# Patient Record
Sex: FEMALE | NOT HISPANIC OR LATINO | Employment: OTHER
[De-identification: names, ages, dates, MRNs, and addresses within clinical notes are randomized per-mention and may not be internally consistent; named-entity substitution may affect disease eponyms.]

---

## 2021-01-01 ENCOUNTER — OFFICE VISIT (OUTPATIENT)
Age: 81
End: 2021-01-01

## 2021-11-24 ENCOUNTER — OFFICE VISIT (OUTPATIENT)
Dept: URBAN - METROPOLITAN AREA CLINIC 9 | Facility: CLINIC | Age: 81
End: 2021-11-24

## 2022-07-30 ENCOUNTER — TELEPHONE ENCOUNTER (OUTPATIENT)
Age: 82
End: 2022-07-30

## 2022-07-31 ENCOUNTER — TELEPHONE ENCOUNTER (OUTPATIENT)
Age: 82
End: 2022-07-31

## 2023-01-26 ENCOUNTER — OFFICE VISIT (OUTPATIENT)
Dept: URBAN - METROPOLITAN AREA CLINIC 9 | Facility: CLINIC | Age: 83
End: 2023-01-26
Payer: MEDICARE

## 2023-01-26 ENCOUNTER — LAB OUTSIDE AN ENCOUNTER (OUTPATIENT)
Dept: URBAN - METROPOLITAN AREA CLINIC 9 | Facility: CLINIC | Age: 83
End: 2023-01-26

## 2023-01-26 VITALS
WEIGHT: 170 LBS | HEIGHT: 61 IN | SYSTOLIC BLOOD PRESSURE: 119 MMHG | BODY MASS INDEX: 32.1 KG/M2 | DIASTOLIC BLOOD PRESSURE: 70 MMHG

## 2023-01-26 DIAGNOSIS — R11.0 NAUSEA: ICD-10-CM

## 2023-01-26 DIAGNOSIS — K59.09 CHRONIC CONSTIPATION: ICD-10-CM

## 2023-01-26 DIAGNOSIS — R10.13 EPIGASTRIC PAIN: ICD-10-CM

## 2023-01-26 DIAGNOSIS — R19.8 CHANGE IN BOWEL MOVEMENT: ICD-10-CM

## 2023-01-26 DIAGNOSIS — R11.15 PERSISTENT VOMITING: ICD-10-CM

## 2023-01-26 PROBLEM — 79922009: Status: ACTIVE | Noted: 2023-01-26

## 2023-01-26 PROBLEM — 196746003: Status: ACTIVE | Noted: 2023-01-26

## 2023-01-26 PROBLEM — 88111009: Status: ACTIVE | Noted: 2023-01-26

## 2023-01-26 PROBLEM — 236069009: Status: ACTIVE | Noted: 2023-01-26

## 2023-01-26 PROCEDURE — 99215 OFFICE O/P EST HI 40 MIN: CPT | Performed by: STUDENT IN AN ORGANIZED HEALTH CARE EDUCATION/TRAINING PROGRAM

## 2023-01-26 RX ORDER — TRAMADOL HYDROCHLORIDE 50 MG/1
1 TABLET AS NEEDED TABLET, FILM COATED ORAL ONCE A DAY
Status: ACTIVE | COMMUNITY

## 2023-01-26 RX ORDER — OMEPRAZOLE 40 MG/1
1 CAPSULE 30 MINUTES BEFORE MORNING MEAL CAPSULE, DELAYED RELEASE ORAL ONCE A DAY
Status: ACTIVE | COMMUNITY

## 2023-01-26 RX ORDER — VENLAFAXINE HYDROCHLORIDE 37.5 MG/1
1 TABLET WITH FOOD TABLET ORAL ONCE A DAY
Status: ACTIVE | COMMUNITY

## 2023-01-26 RX ORDER — TRAZODONE HYDROCHLORIDE 150 MG/1
1 TABLET AT BEDTIME TABLET ORAL ONCE A DAY
Status: ACTIVE | COMMUNITY

## 2023-01-26 RX ORDER — LEVOTHYROXINE SODIUM 25 UG/1
1 TABLET IN THE MORNING ON AN EMPTY STOMACH TABLET ORAL ONCE A DAY
Status: ACTIVE | COMMUNITY

## 2023-01-26 RX ORDER — GABAPENTIN 300 MG/1
1 CAPSULE CAPSULE ORAL ONCE A DAY
Status: ACTIVE | COMMUNITY

## 2023-01-26 NOTE — HPI-TODAY'S VISIT:
82 YO Female presents for chronic vomtiing.  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  Patient had an extensive workup at [facility] with PET scan done without evidence of lympoma.  Now patient presents for persistent vomiting for 1 month duration.  Has been dark colored at times, to both liquid and solids consistency.  Patient has been takign PPI and Zofran without alleviation.  POssibel etiologies consist of gastroparesis, gastric ulceration and PUD,   All previous bloodwork PET scans, previous endoscopy reviewed with patient.  Time spent > 45 minutes.  ALARM SYMPTOMS --No odynophagia --No hematemesis --YES melena / hematochezia --No unintentional weight loss --No iron deficiency anemia  PERTINENT MEDICAL HISTORY Aspirin 81mg PO daily:   --Not Taking Other Blood Thinners:   --Not Taking Diabetes Medication:   --No History  Cardiac Disease:   --No History  Pulmonary Disease --No History  Abdominal Surgery & Hernia:  No History   PERTINENT GI FAMILY HISTORY Colon polyps:  no family history Colon cancer:  no family history   GASTROINTESTINAL PROCEDURE HISTORY Colonoscopy:	 --2019  Findings:  --only verbal report from patient available --normal without polyps  EGD:   --2021

## 2023-02-20 ENCOUNTER — CLAIMS CREATED FROM THE CLAIM WINDOW (OUTPATIENT)
Dept: URBAN - METROPOLITAN AREA CLINIC 4 | Facility: CLINIC | Age: 83
End: 2023-02-20
Payer: MEDICARE

## 2023-02-20 ENCOUNTER — OFFICE VISIT (OUTPATIENT)
Dept: URBAN - METROPOLITAN AREA SURGERY CENTER 9 | Facility: SURGERY CENTER | Age: 83
End: 2023-02-20
Payer: MEDICARE

## 2023-02-20 DIAGNOSIS — Z85.72 HISTORY OF NON-HODGKIN'S LYMPHOMA: ICD-10-CM

## 2023-02-20 DIAGNOSIS — K29.70 CHRONIC ACITVE GASTRITIS (H.PYLORI NEGATIVE): ICD-10-CM

## 2023-02-20 DIAGNOSIS — K25.9 ANTRAL ULCER: ICD-10-CM

## 2023-02-20 DIAGNOSIS — K31.89 OTHER DISEASES OF STOMACH AND DUODENUM: ICD-10-CM

## 2023-02-20 PROBLEM — 428046009: Status: ACTIVE | Noted: 2023-02-20

## 2023-02-20 PROCEDURE — 88341 IMHCHEM/IMCYTCHM EA ADD ANTB: CPT | Performed by: PATHOLOGY

## 2023-02-20 PROCEDURE — 88342 IMHCHEM/IMCYTCHM 1ST ANTB: CPT | Performed by: PATHOLOGY

## 2023-02-20 PROCEDURE — 43239 EGD BIOPSY SINGLE/MULTIPLE: CPT | Performed by: STUDENT IN AN ORGANIZED HEALTH CARE EDUCATION/TRAINING PROGRAM

## 2023-02-20 PROCEDURE — 43239 EGD BIOPSY SINGLE/MULTIPLE: CPT | Performed by: CLINIC/CENTER

## 2023-02-20 PROCEDURE — 88305 TISSUE EXAM BY PATHOLOGIST: CPT | Performed by: PATHOLOGY

## 2023-02-20 RX ORDER — LEVOTHYROXINE SODIUM 25 UG/1
1 TABLET IN THE MORNING ON AN EMPTY STOMACH TABLET ORAL ONCE A DAY
Status: ACTIVE | COMMUNITY

## 2023-02-20 RX ORDER — OMEPRAZOLE 40 MG/1
1 CAPSULE 30 MINUTES BEFORE MORNING MEAL CAPSULE, DELAYED RELEASE ORAL ONCE A DAY
Status: ACTIVE | COMMUNITY

## 2023-02-20 RX ORDER — TRAZODONE HYDROCHLORIDE 150 MG/1
1 TABLET AT BEDTIME TABLET ORAL ONCE A DAY
Status: ACTIVE | COMMUNITY

## 2023-02-20 RX ORDER — VENLAFAXINE HYDROCHLORIDE 37.5 MG/1
1 TABLET WITH FOOD TABLET ORAL ONCE A DAY
Status: ACTIVE | COMMUNITY

## 2023-02-20 RX ORDER — TRAMADOL HYDROCHLORIDE 50 MG/1
1 TABLET AS NEEDED TABLET, FILM COATED ORAL ONCE A DAY
Status: ACTIVE | COMMUNITY

## 2023-02-20 RX ORDER — GABAPENTIN 300 MG/1
1 CAPSULE CAPSULE ORAL ONCE A DAY
Status: ACTIVE | COMMUNITY

## 2023-04-17 ENCOUNTER — WEB ENCOUNTER (OUTPATIENT)
Dept: URBAN - METROPOLITAN AREA CLINIC 9 | Facility: CLINIC | Age: 83
End: 2023-04-17

## 2023-04-17 ENCOUNTER — OFFICE VISIT (OUTPATIENT)
Dept: URBAN - METROPOLITAN AREA CLINIC 9 | Facility: CLINIC | Age: 83
End: 2023-04-17
Payer: MEDICARE

## 2023-04-17 ENCOUNTER — DASHBOARD ENCOUNTERS (OUTPATIENT)
Age: 83
End: 2023-04-17

## 2023-04-17 VITALS
BODY MASS INDEX: 31.15 KG/M2 | SYSTOLIC BLOOD PRESSURE: 110 MMHG | HEIGHT: 61 IN | DIASTOLIC BLOOD PRESSURE: 72 MMHG | WEIGHT: 165 LBS

## 2023-04-17 DIAGNOSIS — R10.13 EPIGASTRIC PAIN: ICD-10-CM

## 2023-04-17 DIAGNOSIS — Z85.72 HISTORY OF NON-HODGKIN'S LYMPHOMA: ICD-10-CM

## 2023-04-17 DIAGNOSIS — K59.09 CHRONIC CONSTIPATION: ICD-10-CM

## 2023-04-17 DIAGNOSIS — R53.82 CHRONIC FATIGUE: ICD-10-CM

## 2023-04-17 DIAGNOSIS — K25.7 CHRONIC GASTRIC ULCER WITHOUT HEMORRHAGE AND WITHOUT PERFORATION: ICD-10-CM

## 2023-04-17 PROBLEM — 76796008: Status: ACTIVE | Noted: 2023-04-17

## 2023-04-17 PROBLEM — 84229001: Status: ACTIVE | Noted: 2023-04-17

## 2023-04-17 PROCEDURE — 99214 OFFICE O/P EST MOD 30 MIN: CPT | Performed by: STUDENT IN AN ORGANIZED HEALTH CARE EDUCATION/TRAINING PROGRAM

## 2023-04-17 RX ORDER — TRAMADOL HYDROCHLORIDE 50 MG/1
1 TABLET AS NEEDED TABLET, FILM COATED ORAL ONCE A DAY
Status: ACTIVE | COMMUNITY

## 2023-04-17 RX ORDER — VENLAFAXINE HYDROCHLORIDE 37.5 MG/1
1 TABLET WITH FOOD TABLET ORAL ONCE A DAY
Status: ACTIVE | COMMUNITY

## 2023-04-17 RX ORDER — TRAZODONE HYDROCHLORIDE 150 MG/1
1 TABLET AT BEDTIME TABLET ORAL ONCE A DAY
Status: ACTIVE | COMMUNITY

## 2023-04-17 RX ORDER — GABAPENTIN 300 MG/1
1 CAPSULE CAPSULE ORAL ONCE A DAY
Status: ACTIVE | COMMUNITY

## 2023-04-17 RX ORDER — LEVOTHYROXINE SODIUM 25 UG/1
1 TABLET IN THE MORNING ON AN EMPTY STOMACH TABLET ORAL ONCE A DAY
Status: ACTIVE | COMMUNITY

## 2023-04-17 RX ORDER — OMEPRAZOLE 40 MG/1
1 CAPSULE 30 MINUTES BEFORE MORNING MEAL CAPSULE, DELAYED RELEASE ORAL ONCE A DAY
Status: ACTIVE | COMMUNITY

## 2023-04-17 NOTE — HPI-TODAY'S VISIT:
84 YO Female presents for chronic vomtiing.  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  Patient had an extensive workup at [facility] with PET scan done without evidence of lympoma.  Now patient presents for persistent vomiting for 1 month duration.  Has been dark colored at times, to both liquid and solids consistency.  Patient has been takign PPI and Zofran without alleviation.  POssibel etiologies consist of gastroparesis, gastric ulceration and PUD,   All previous bloodwork PET scans, previous endoscopy reviewed with patient.  Time spent > 45 minutes.   4/17/23: presents for follow up after EGD with no evidence of lymphoma in any biopsies.  Proceudre reports sent to doctor as patient requested.  If symptoms of chronic constipation and patient is taking tramadol for pain.  Advised patient to have daily miralax or metamucil for bowel regimen. Samples given to patient.  RTC as needed.  PERTINENT MEDICAL HISTORY Aspirin 81mg PO daily:   --Not Taking Other Blood Thinners:   --Not Taking Diabetes Medication:   --No History  Cardiac Disease:   --No History  Pulmonary Disease --No History  Abdominal Surgery & Hernia:  No History   PERTINENT GI FAMILY HISTORY Colon polyps:  no family history Colon cancer:  no family history   GASTROINTESTINAL PROCEDURE HISTORY Colonoscopy:	 --2019  Findings:  --only verbal report from patient available --normal without polyps  EGD:   --2021

## 2025-04-01 ENCOUNTER — OFFICE VISIT (OUTPATIENT)
Dept: URBAN - METROPOLITAN AREA CLINIC 9 | Facility: CLINIC | Age: 85
End: 2025-04-01
Payer: MEDICARE

## 2025-04-01 DIAGNOSIS — D64.9 ABSOLUTE ANEMIA: ICD-10-CM

## 2025-04-01 DIAGNOSIS — R11.15 PERSISTENT VOMITING: ICD-10-CM

## 2025-04-01 DIAGNOSIS — K29.70 CHRONIC ACITVE GASTRITIS (H.PYLORI NEGATIVE): ICD-10-CM

## 2025-04-01 DIAGNOSIS — K59.09 CHRONIC CONSTIPATION: ICD-10-CM

## 2025-04-01 PROBLEM — 271737000: Status: ACTIVE | Noted: 2025-04-01

## 2025-04-01 PROBLEM — 4556007: Status: ACTIVE | Noted: 2025-04-01

## 2025-04-01 PROCEDURE — 99214 OFFICE O/P EST MOD 30 MIN: CPT | Performed by: PHYSICIAN ASSISTANT

## 2025-04-01 RX ORDER — GABAPENTIN 300 MG/1
1 CAPSULE CAPSULE ORAL ONCE A DAY
Status: ACTIVE | COMMUNITY

## 2025-04-01 RX ORDER — LEVOTHYROXINE SODIUM 25 UG/1
1 TABLET IN THE MORNING ON AN EMPTY STOMACH TABLET ORAL ONCE A DAY
Status: ACTIVE | COMMUNITY

## 2025-04-01 RX ORDER — VENLAFAXINE HYDROCHLORIDE 37.5 MG/1
1 TABLET WITH FOOD TABLET ORAL ONCE A DAY
Status: ACTIVE | COMMUNITY

## 2025-04-01 RX ORDER — TRAMADOL HYDROCHLORIDE 50 MG/1
1 TABLET AS NEEDED TABLET, FILM COATED ORAL ONCE A DAY
Status: ACTIVE | COMMUNITY

## 2025-04-01 RX ORDER — OMEPRAZOLE 40 MG/1
1 CAPSULE 30 MINUTES BEFORE MORNING MEAL CAPSULE, DELAYED RELEASE ORAL ONCE A DAY
Status: ACTIVE | COMMUNITY

## 2025-04-01 RX ORDER — TRAZODONE HYDROCHLORIDE 150 MG/1
1 TABLET AT BEDTIME TABLET ORAL ONCE A DAY
Status: ACTIVE | COMMUNITY

## 2025-04-01 RX ORDER — VIBEGRON 75 MG/1
TABLET, FILM COATED ORAL
Qty: 30 TABLET | Status: ACTIVE | COMMUNITY

## 2025-04-01 NOTE — PHYSICAL EXAM CONSTITUTIONAL:
well developed, well nourished , in no acute distress , ambulating without difficulty , normal communication ability, she is alert, awake.

## 2025-04-01 NOTE — HPI-TODAY'S VISIT:
Female patient, formally seen by Dr. Mariano most recently 4/2023, with history of  EMZL. At that time patient was having significant vomiting and dark stools.  PERTINENT GI FAMILY HISTORY Colon polyps:  no family history  GASTROINTESTINAL PROCEDURE HISTORY Colonoscopy: --2019. --only verbal report from patient available, no polyps. Dr. Chen.  EGD -  2/2023 gastritis/gastric ulcers, path with no significant abnormality of duodenum, foveolar hyperplasia of stomach, no lymphoma, no HP.   Lab 1/2025 LFTs-NL, TSH elevated, H GB/HCT 9.0/29.3, indices low, platelets normal.  Patient denies use of blood thinners, hx of CV disease, pacemaker or ICD or other implanted wired device, seizure disorders, SOILA, use of CPAP, severe pulmonary disease, prior issues with anesthesia.   IH 4/17/23: presents for follow up after EGD with no evidence of lymphoma in any biopsies. Proceudre reports sent to doctor as patient requested. If symptoms of chronic constipation and patient is taking tramadol for pain. Advised patient to have daily miralax or metamucil for bowel regimen. Samples given to patient. RTC as needed.  IH 4/1/25 PT patient referred by hematologist for further evaluation of anemia. Hg 9.0/Hct 29.3.  No overt signs of bleeding. She denies more than usual fatigue, lightheadedness/dizziness, shortness of breath, tachycardia. Last colonoscopy 2019, last EGD 2023-see reports above. Not taking oral or IV iron.   Also, began to throw up three weeks ago - occurs after drinking coffee in am and can occur multiple times a day..  This has been an issue for her for years.   She has chronic constipation - uses Miralax and controls well.  She has had no recent CTs.  Recent labs with nl LFTS.   Will arrange for EGD (first (d/t age) and repeat CBC CBC, obtain studies iron studies.  She was originally referred back in Jan to [facility] but refuses to ever go back there. Just now got around to getting to us. If EGD unrevealing for cause of anemia, then colonoscopy, capsule if that is negative. Pt expresses understanding of risk of all procedues d/t age.

## 2025-04-15 ENCOUNTER — TELEPHONE ENCOUNTER (OUTPATIENT)
Dept: URBAN - METROPOLITAN AREA CLINIC 9 | Facility: CLINIC | Age: 85
End: 2025-04-15

## 2025-04-15 ENCOUNTER — CLAIMS CREATED FROM THE CLAIM WINDOW (OUTPATIENT)
Dept: URBAN - METROPOLITAN AREA CLINIC 4 | Facility: CLINIC | Age: 85
End: 2025-04-15
Payer: MEDICARE

## 2025-04-15 ENCOUNTER — CLAIMS CREATED FROM THE CLAIM WINDOW (OUTPATIENT)
Dept: URBAN - METROPOLITAN AREA SURGERY CENTER 9 | Facility: SURGERY CENTER | Age: 85
End: 2025-04-15
Payer: MEDICARE

## 2025-04-15 ENCOUNTER — TELEPHONE ENCOUNTER (OUTPATIENT)
Dept: URBAN - METROPOLITAN AREA SURGERY CENTER 9 | Facility: SURGERY CENTER | Age: 85
End: 2025-04-15

## 2025-04-15 DIAGNOSIS — K44.9 HIATAL HERNIA: ICD-10-CM

## 2025-04-15 DIAGNOSIS — K29.70 GASTRITIS: ICD-10-CM

## 2025-04-15 DIAGNOSIS — K44.9 DIAPHRAGMATIC HERNIA WITHOUT OBSTRUCTION OR GANGRENE: ICD-10-CM

## 2025-04-15 DIAGNOSIS — K31.89 OTHER DISEASES OF STOMACH AND DUODENUM: ICD-10-CM

## 2025-04-15 DIAGNOSIS — K29.70 GASTRITIS WITHOUT BLEEDING, UNSPECIFIED CHRONICITY, UNSPECIFIED GASTRITIS TYPE: ICD-10-CM

## 2025-04-15 DIAGNOSIS — K31.7 POLYP OF STOMACH AND DUODENUM: ICD-10-CM

## 2025-04-15 PROBLEM — 397825006: Status: ACTIVE | Noted: 2025-04-15

## 2025-04-15 PROCEDURE — 88305 TISSUE EXAM BY PATHOLOGIST: CPT | Performed by: PATHOLOGY

## 2025-04-15 PROCEDURE — 43239 EGD BIOPSY SINGLE/MULTIPLE: CPT | Performed by: CLINIC/CENTER

## 2025-04-15 PROCEDURE — 88312 SPECIAL STAINS GROUP 1: CPT | Performed by: PATHOLOGY

## 2025-04-15 PROCEDURE — 43239 EGD BIOPSY SINGLE/MULTIPLE: CPT | Performed by: INTERNAL MEDICINE

## 2025-04-15 PROCEDURE — 00731 ANES UPR GI NDSC PX NOS: CPT | Performed by: NURSE ANESTHETIST, CERTIFIED REGISTERED

## 2025-04-15 RX ORDER — GABAPENTIN 300 MG/1
1 CAPSULE CAPSULE ORAL ONCE A DAY
Status: ACTIVE | COMMUNITY

## 2025-04-15 RX ORDER — LEVOTHYROXINE SODIUM 25 UG/1
1 TABLET IN THE MORNING ON AN EMPTY STOMACH TABLET ORAL ONCE A DAY
Status: ACTIVE | COMMUNITY

## 2025-04-15 RX ORDER — OMEPRAZOLE 40 MG/1
1 CAPSULE 30 MINUTES BEFORE MORNING MEAL CAPSULE, DELAYED RELEASE ORAL ONCE A DAY
Status: ACTIVE | COMMUNITY

## 2025-04-15 RX ORDER — VIBEGRON 75 MG/1
TABLET, FILM COATED ORAL
Qty: 30 TABLET | Status: ACTIVE | COMMUNITY

## 2025-04-15 RX ORDER — TRAMADOL HYDROCHLORIDE 50 MG/1
1 TABLET AS NEEDED TABLET, FILM COATED ORAL ONCE A DAY
Status: ACTIVE | COMMUNITY

## 2025-04-15 RX ORDER — OMEPRAZOLE 40 MG/1
1 CAPSULE 30 MINUTES BEFORE MORNING MEAL CAPSULE, DELAYED RELEASE ORAL TWICE A DAY
Qty: 90 | Refills: 1

## 2025-04-15 RX ORDER — SUCRALFATE 1 G/1
1 TABLET ON AN EMPTY STOMACH TABLET ORAL
Qty: 120 TABLET | Refills: 3 | OUTPATIENT
Start: 2025-04-15

## 2025-04-15 RX ORDER — TRAZODONE HYDROCHLORIDE 150 MG/1
1 TABLET AT BEDTIME TABLET ORAL ONCE A DAY
Status: ACTIVE | COMMUNITY

## 2025-04-15 RX ORDER — VENLAFAXINE HYDROCHLORIDE 37.5 MG/1
1 TABLET WITH FOOD TABLET ORAL ONCE A DAY
Status: ACTIVE | COMMUNITY

## 2025-04-23 ENCOUNTER — OFFICE VISIT (OUTPATIENT)
Dept: URBAN - METROPOLITAN AREA CLINIC 9 | Facility: CLINIC | Age: 85
End: 2025-04-23

## 2025-04-30 ENCOUNTER — TELEPHONE ENCOUNTER (OUTPATIENT)
Dept: URBAN - METROPOLITAN AREA CLINIC 9 | Facility: CLINIC | Age: 85
End: 2025-04-30

## 2025-05-20 ENCOUNTER — OFFICE VISIT (OUTPATIENT)
Dept: URBAN - METROPOLITAN AREA CLINIC 9 | Facility: CLINIC | Age: 85
End: 2025-05-20
Payer: MEDICARE

## 2025-05-20 DIAGNOSIS — R11.15 PERSISTENT VOMITING: ICD-10-CM

## 2025-05-20 DIAGNOSIS — R10.13 EPIGASTRIC PAIN: ICD-10-CM

## 2025-05-20 DIAGNOSIS — K44.9 LARGE HIATAL HERNIA: ICD-10-CM

## 2025-05-20 DIAGNOSIS — R14.0 ABDOMINAL BLOATING: ICD-10-CM

## 2025-05-20 PROCEDURE — 99214 OFFICE O/P EST MOD 30 MIN: CPT | Performed by: PHYSICIAN ASSISTANT

## 2025-05-20 RX ORDER — VENLAFAXINE HYDROCHLORIDE 37.5 MG/1
1 TABLET WITH FOOD TABLET ORAL ONCE A DAY
Status: ACTIVE | COMMUNITY

## 2025-05-20 RX ORDER — GABAPENTIN 300 MG/1
1 CAPSULE CAPSULE ORAL ONCE A DAY
Status: ACTIVE | COMMUNITY

## 2025-05-20 RX ORDER — LEVOTHYROXINE SODIUM 25 UG/1
1 TABLET IN THE MORNING ON AN EMPTY STOMACH TABLET ORAL ONCE A DAY
Status: ACTIVE | COMMUNITY

## 2025-05-20 RX ORDER — VIBEGRON 75 MG/1
TABLET, FILM COATED ORAL
Qty: 30 TABLET | Status: ACTIVE | COMMUNITY

## 2025-05-20 RX ORDER — TRAZODONE HYDROCHLORIDE 150 MG/1
1 TABLET AT BEDTIME TABLET ORAL ONCE A DAY
Status: ACTIVE | COMMUNITY

## 2025-05-20 RX ORDER — SUCRALFATE 1 G/1
1 TABLET ON AN EMPTY STOMACH TABLET ORAL
Qty: 120 TABLET | Refills: 3 | Status: ACTIVE | COMMUNITY
Start: 2025-04-15

## 2025-05-20 RX ORDER — TRAMADOL HYDROCHLORIDE 50 MG/1
1 TABLET AS NEEDED TABLET, FILM COATED ORAL ONCE A DAY
Status: ACTIVE | COMMUNITY

## 2025-05-20 RX ORDER — OMEPRAZOLE 40 MG/1
1 CAPSULE 30 MINUTES BEFORE MORNING MEAL CAPSULE, DELAYED RELEASE ORAL TWICE A DAY
Qty: 90 | Refills: 1 | Status: ACTIVE | COMMUNITY

## 2025-05-20 NOTE — HPI-TODAY'S VISIT:
Female patient, formally seen by Dr. Mariano most recently 4/2023, with history of  EMZL. At that time patient was having significant vomiting and dark stools.  PERTINENT GI FAMILY HISTORY Colon polyps:  no family history  ENDOs: Colonoscopy: --2019. --only verbal report from patient available, no polyps. Dr. Chen.  EGD -  2/2023 gastritis/gastric ulcers, path with no significant abnormality of duodenum, foveolar hyperplasia of stomach, no lymphoma, no HP.  EGD 2025 - Large hiatal hernia with significant inflammation. Path with gastropathy, no evidence of celiac.  Lab 1/2025 LFTs-NL, TSH elevated, H GB/HCT 9.0/29.3, indices low, platelets normal.  Patient denies use of blood thinners, hx of CV disease, pacemaker or ICD or other implanted wired device, seizure disorders, SOILA, use of CPAP, severe pulmonary disease, prior issues with anesthesia.   IH 4/17/23: presents for follow up after EGD with no evidence of lymphoma in any biopsies. Proceudre reports sent to doctor as patient requested. If symptoms of chronic constipation and patient is taking tramadol for pain. Advised patient to have daily miralax or metamucil for bowel regimen. Samples given to patient. RTC as needed.  IH 4/1/25 PT patient referred by hematologist for further evaluation of anemia. Hg 9.0/Hct 29.3.  No overt signs of bleeding. She denies more than usual fatigue, lightheadedness/dizziness, shortness of breath, tachycardia. Last colonoscopy 2019, last EGD 2023-see reports above. Not taking oral or IV iron.   Also, began to throw up three weeks ago - occurs after drinking coffee in am and can occur multiple times a day..  This has been an issue for her for years.   She has chronic constipation - uses Miralax and controls well.  She has had no recent CTs.  Recent labs with nl LFTS.   Will arrange for EGD (first (d/t age) and repeat CBC CBC, obtain studies iron studies.  She was originally referred back in Jan to [facility] but refuses to ever go back there. Just now got around to getting to us. If EGD unrevealing for cause of anemia, then colonoscopy, capsule if that is negative. Pt expresses understanding of risk of all procedues d/t age.  Interim visit 5/20/2025 EGD was scheduled at last visit. Found to have a large hiatal hernia with significant inflammation, likely cause of anemia. Bx with gastropathy, no evidence of celiac.  Placed on Carafate QID (but really only getting three in) and Prilosec BID with repeat EGD 6/16/2025. Nausea and epigastric discomfort are also resolved.  She is not feeling lightheaded any longer. Said that her BP was running low. She is drinking gatorade now.  Still feeling quite weak.  Goes once a week to [facility] for therapy.  She is taking iron qod.  Will check a CBC. SHe is also c/o of feeling bloated, clothes fitting tight around her abdomen. No abd pain, constipation well controlled.  WIll obtain CT s/p in light of abdominal syptoms. .   RTC post EGD in June.

## 2025-05-20 NOTE — PHYSICAL EXAM CHEST:
Bed: 01  Expected date:   Expected time:   Means of arrival:   Comments:  Radha when clean     chest wall non-tender, breathing is unlabored without accessory muscle use, normal breath sounds

## 2025-06-16 ENCOUNTER — CLAIMS CREATED FROM THE CLAIM WINDOW (OUTPATIENT)
Dept: URBAN - METROPOLITAN AREA CLINIC 4 | Facility: CLINIC | Age: 85
End: 2025-06-16
Payer: MEDICARE

## 2025-06-16 ENCOUNTER — CLAIMS CREATED FROM THE CLAIM WINDOW (OUTPATIENT)
Dept: URBAN - METROPOLITAN AREA SURGERY CENTER 9 | Facility: SURGERY CENTER | Age: 85
End: 2025-06-16
Payer: MEDICARE

## 2025-06-16 DIAGNOSIS — K29.00 ACUTE GASTRITIS WITHOUT BLEEDING: ICD-10-CM

## 2025-06-16 DIAGNOSIS — K31.89 OTHER DISEASES OF STOMACH AND DUODENUM: ICD-10-CM

## 2025-06-16 DIAGNOSIS — K44.9 DIAPHRAGMATIC HERNIA WITHOUT OBSTRUCTION OR GANGRENE: ICD-10-CM

## 2025-06-16 DIAGNOSIS — K29.70 GASTRITIS: ICD-10-CM

## 2025-06-16 DIAGNOSIS — K29.70 GASTRITIS WITHOUT BLEEDING, UNSPECIFIED CHRONICITY, UNSPECIFIED GASTRITIS TYPE: ICD-10-CM

## 2025-06-16 PROCEDURE — 43239 EGD BIOPSY SINGLE/MULTIPLE: CPT | Performed by: CLINIC/CENTER

## 2025-06-16 PROCEDURE — 88312 SPECIAL STAINS GROUP 1: CPT | Performed by: PATHOLOGY

## 2025-06-16 PROCEDURE — 00731 ANES UPR GI NDSC PX NOS: CPT | Performed by: NURSE ANESTHETIST, CERTIFIED REGISTERED

## 2025-06-16 PROCEDURE — 88305 TISSUE EXAM BY PATHOLOGIST: CPT | Performed by: PATHOLOGY

## 2025-06-16 PROCEDURE — 43239 EGD BIOPSY SINGLE/MULTIPLE: CPT | Performed by: INTERNAL MEDICINE

## 2025-06-16 RX ORDER — VIBEGRON 75 MG/1
TABLET, FILM COATED ORAL
Qty: 30 TABLET | Status: ACTIVE | COMMUNITY

## 2025-06-16 RX ORDER — SUCRALFATE 1 G/1
1 TABLET ON AN EMPTY STOMACH TABLET ORAL
Qty: 120 TABLET | Refills: 3 | Status: ACTIVE | COMMUNITY
Start: 2025-04-15

## 2025-06-16 RX ORDER — VENLAFAXINE HYDROCHLORIDE 37.5 MG/1
1 TABLET WITH FOOD TABLET ORAL ONCE A DAY
Status: ACTIVE | COMMUNITY

## 2025-06-16 RX ORDER — LEVOTHYROXINE SODIUM 25 UG/1
1 TABLET IN THE MORNING ON AN EMPTY STOMACH TABLET ORAL ONCE A DAY
Status: ACTIVE | COMMUNITY

## 2025-06-16 RX ORDER — TRAZODONE HYDROCHLORIDE 150 MG/1
1 TABLET AT BEDTIME TABLET ORAL ONCE A DAY
Status: ACTIVE | COMMUNITY

## 2025-06-16 RX ORDER — OMEPRAZOLE 40 MG/1
1 CAPSULE 30 MINUTES BEFORE MORNING MEAL CAPSULE, DELAYED RELEASE ORAL TWICE A DAY
Qty: 90 | Refills: 1 | Status: ACTIVE | COMMUNITY

## 2025-06-16 RX ORDER — GABAPENTIN 300 MG/1
1 CAPSULE CAPSULE ORAL ONCE A DAY
Status: ACTIVE | COMMUNITY

## 2025-06-16 RX ORDER — TRAMADOL HYDROCHLORIDE 50 MG/1
1 TABLET AS NEEDED TABLET, FILM COATED ORAL ONCE A DAY
Status: ACTIVE | COMMUNITY

## 2025-07-22 ENCOUNTER — OFFICE VISIT (OUTPATIENT)
Dept: URBAN - METROPOLITAN AREA CLINIC 9 | Facility: CLINIC | Age: 85
End: 2025-07-22

## 2025-07-22 NOTE — HPI-TODAY'S VISIT:
Female patient, formally seen by Dr. Mariano most recently 4/2023, with history of  EMZL. At that time patient was having significant vomiting and dark stools.  PERTINENT GI FAMILY HISTORY Colon polyps:  no family history  ENDOs: Colonoscopy: --2019. --only verbal report from patient available, no polyps. Dr. Chen.  EGD -  2/2023 gastritis/gastric ulcers, path with no significant abnormality of duodenum, foveolar hyperplasia of stomach, no lymphoma, no HP.  EGD 2025 - Large hiatal hernia with significant inflammation. Path with gastropathy, no evidence of celiac.   6/2025 EGD gastritis, large 5 cm HH, nodular mucosa in the cardia.  Path stomach unrevealing.  Path cardia stomach acute erosive/hemorrhagic gastritis, negative HP.  Repeat EGD 1 year advised.  Lab 1/2025 LFTs-NL, TSH elevated, H GB/HCT 9.0/29.3, indices low, platelets normal. 5/21/2025 H/H 11.2/36 (was 8.3/28 2 months ago). 6/2025 CT A/P with and without-moderate size sliding HH, otherwise no significant GI findings.  IH 4/17/23: presents for follow up after EGD with no evidence of lymphoma in any biopsies. Proceudre reports sent to doctor as patient requested. If symptoms of chronic constipation and patient is taking tramadol for pain. Advised patient to have daily miralax or metamucil for bowel regimen. Samples given to patient. RTC as needed.  IH 4/1/25 PT patient referred by hematologist for further evaluation of anemia. Hg 9.0/Hct 29.3.  No overt signs of bleeding. She denies more than usual fatigue, lightheadedness/dizziness, shortness of breath, tachycardia. Last colonoscopy 2019, last EGD 2023-see reports above. Not taking oral or IV iron.   Also, began to throw up three weeks ago - occurs after drinking coffee in am and can occur multiple times a day..  This has been an issue for her for years.   She has chronic constipation - uses Miralax and controls well.  She has had no recent CTs.  Recent labs with nl LFTS.   Will arrange for EGD (first (d/t age) and repeat CBC CBC, obtain studies iron studies.  She was originally referred back in Jan to [facility] but refuses to ever go back there. Just now got around to getting to us. If EGD unrevealing for cause of anemia, then colonoscopy, capsule if that is negative. Pt expresses understanding of risk of all procedues d/t age.  Interim visit 5/20/2025 EGD was scheduled at last visit. Found to have a large hiatal hernia with significant inflammation, likely cause of anemia. Bx with gastropathy, no evidence of celiac.  Placed on Carafate QID (but really only getting three in) and Prilosec BID with repeat EGD 6/16/2025. Nausea and epigastric discomfort are also resolved.  She is not feeling lightheaded any longer. Said that her BP was running low. She is drinking gatorade now.  Still feeling quite weak.  Goes once a week to [facility] for therapy.  She is taking iron qod.  Will check a CBC. SHe is also c/o of feeling bloated, clothes fitting tight around her abdomen. No abd pain, constipation well controlled.  WIll obtain CT s/p in light of abdominal syptoms. .   RTC post EGD in June.  Interim visit 7/22/2025 Patient presents today post recent follow-up EGD.  EGD revealed large HH, gastritis, and acute erosive/hemorrhagic gastritis in the cardia of the stomach.  She has been on twice daily omeprazole as well as Carafate 4 times a day since last EGD 4/2025. CT a/p with no alarming GI finding. Labs with improved H/H ? iron supp

## 2025-08-27 ENCOUNTER — OFFICE VISIT (OUTPATIENT)
Dept: URBAN - METROPOLITAN AREA CLINIC 9 | Facility: CLINIC | Age: 85
End: 2025-08-27

## 2025-08-27 DIAGNOSIS — K59.09 CHRONIC CONSTIPATION: ICD-10-CM

## 2025-08-27 DIAGNOSIS — K44.9 LARGE HIATAL HERNIA: ICD-10-CM

## 2025-08-27 DIAGNOSIS — D64.9 ANEMIA, UNSPECIFIED TYPE: ICD-10-CM

## 2025-08-27 DIAGNOSIS — K29.71 GASTRITIS WITH HEMORRHAGE, UNSPECIFIED CHRONICITY, UNSPECIFIED GASTRITIS TYPE: ICD-10-CM

## 2025-08-27 RX ORDER — VENLAFAXINE HYDROCHLORIDE 37.5 MG/1
1 TABLET WITH FOOD TABLET ORAL ONCE A DAY
Status: ACTIVE | COMMUNITY

## 2025-08-27 RX ORDER — SUCRALFATE 1 G/1
1 TABLET ON AN EMPTY STOMACH TABLET ORAL
OUTPATIENT
Start: 2025-04-15

## 2025-08-27 RX ORDER — FERROUS SULFATE TAB EC 325 MG (65 MG FE EQUIVALENT) 325 (65 FE) MG
1 TABLET TABLET DELAYED RESPONSE ORAL
Status: ACTIVE | COMMUNITY

## 2025-08-27 RX ORDER — VIBEGRON 75 MG/1
TABLET, FILM COATED ORAL
Qty: 30 TABLET | Status: ACTIVE | COMMUNITY

## 2025-08-27 RX ORDER — GABAPENTIN 100 MG/1
1 CAPSULE AT BEDTIME CAPSULE ORAL ONCE A DAY
Status: ACTIVE | COMMUNITY

## 2025-08-27 RX ORDER — OMEPRAZOLE 40 MG/1
1 CAPSULE 30 MINUTES BEFORE MORNING MEAL CAPSULE, DELAYED RELEASE ORAL TWICE A DAY
Qty: 90 | Refills: 1 | Status: ACTIVE | COMMUNITY

## 2025-08-27 RX ORDER — BUPRENORPHINE 5 UG/H
1 PATCH TO SKIN PATCH, EXTENDED RELEASE TRANSDERMAL
Status: ACTIVE | COMMUNITY

## 2025-08-27 RX ORDER — SUCRALFATE 1 G/1
1 TABLET ON AN EMPTY STOMACH TABLET ORAL
Qty: 120 TABLET | Refills: 3 | Status: ACTIVE | COMMUNITY
Start: 2025-04-15

## 2025-08-27 RX ORDER — LEVOTHYROXINE SODIUM 25 UG/1
1 TABLET IN THE MORNING ON AN EMPTY STOMACH TABLET ORAL ONCE A DAY
Status: ACTIVE | COMMUNITY

## 2025-08-27 RX ORDER — TRAZODONE HYDROCHLORIDE 150 MG/1
1 TABLET AT BEDTIME TABLET ORAL ONCE A DAY
Status: ACTIVE | COMMUNITY

## 2025-08-27 RX ORDER — TRAMADOL HYDROCHLORIDE 50 MG/1
1 TABLET AS NEEDED TABLET, FILM COATED ORAL ONCE A DAY
Status: ACTIVE | COMMUNITY

## 2025-08-28 ENCOUNTER — CLINIC PROCEDURE ONLY (OUTPATIENT)
Age: 85
End: 2025-08-28

## 2025-08-28 DIAGNOSIS — H35.3121: ICD-10-CM

## 2025-08-28 DIAGNOSIS — H35.3212: ICD-10-CM

## 2025-08-28 PROCEDURE — 92134 CPTRZ OPH DX IMG PST SGM RTA: CPT

## 2025-08-28 PROCEDURE — 67028 INJECTION EYE DRUG: CPT

## 2025-08-28 PROCEDURE — 92250 FUNDUS PHOTOGRAPHY W/I&R: CPT

## 2025-08-28 PROCEDURE — J3490AVAJZ AVASTIN, NO DRUG WASTE
